# Patient Record
Sex: FEMALE | Race: WHITE | NOT HISPANIC OR LATINO
[De-identification: names, ages, dates, MRNs, and addresses within clinical notes are randomized per-mention and may not be internally consistent; named-entity substitution may affect disease eponyms.]

---

## 2018-03-08 ENCOUNTER — RESULT REVIEW (OUTPATIENT)
Age: 68
End: 2018-03-08

## 2018-11-16 ENCOUNTER — RECORD ABSTRACTING (OUTPATIENT)
Age: 68
End: 2018-11-16

## 2018-11-16 DIAGNOSIS — Z78.9 OTHER SPECIFIED HEALTH STATUS: ICD-10-CM

## 2018-11-16 DIAGNOSIS — Z82.0 FAMILY HISTORY OF EPILEPSY AND OTHER DISEASES OF THE NERVOUS SYSTEM: ICD-10-CM

## 2018-11-16 DIAGNOSIS — Z86.69 PERSONAL HISTORY OF OTHER DISEASES OF THE NERVOUS SYSTEM AND SENSE ORGANS: ICD-10-CM

## 2018-11-16 DIAGNOSIS — Z82.49 FAMILY HISTORY OF ISCHEMIC HEART DISEASE AND OTHER DISEASES OF THE CIRCULATORY SYSTEM: ICD-10-CM

## 2018-11-16 DIAGNOSIS — Z87.39 PERSONAL HISTORY OF OTHER DISEASES OF THE MUSCULOSKELETAL SYSTEM AND CONNECTIVE TISSUE: ICD-10-CM

## 2018-11-16 DIAGNOSIS — Z83.3 FAMILY HISTORY OF DIABETES MELLITUS: ICD-10-CM

## 2018-11-16 LAB — CYTOLOGY CVX/VAG DOC THIN PREP: NORMAL

## 2018-11-16 RX ORDER — ASCORBIC ACID 500 MG
500 TABLET ORAL DAILY
Refills: 0 | Status: ACTIVE | COMMUNITY

## 2018-12-07 ENCOUNTER — APPOINTMENT (OUTPATIENT)
Dept: CARDIOLOGY | Facility: CLINIC | Age: 68
End: 2018-12-07

## 2019-01-14 ENCOUNTER — RECORD ABSTRACTING (OUTPATIENT)
Age: 69
End: 2019-01-14

## 2019-01-15 ENCOUNTER — TRANSCRIPTION ENCOUNTER (OUTPATIENT)
Age: 69
End: 2019-01-15

## 2019-01-15 ENCOUNTER — NON-APPOINTMENT (OUTPATIENT)
Age: 69
End: 2019-01-15

## 2019-01-15 ENCOUNTER — APPOINTMENT (OUTPATIENT)
Dept: CARDIOLOGY | Facility: CLINIC | Age: 69
End: 2019-01-15
Payer: MEDICARE

## 2019-01-15 VITALS
WEIGHT: 161 LBS | DIASTOLIC BLOOD PRESSURE: 70 MMHG | HEIGHT: 61 IN | BODY MASS INDEX: 30.4 KG/M2 | SYSTOLIC BLOOD PRESSURE: 124 MMHG | HEART RATE: 63 BPM

## 2019-01-15 DIAGNOSIS — Z82.49 FAMILY HISTORY OF ISCHEMIC HEART DISEASE AND OTHER DISEASES OF THE CIRCULATORY SYSTEM: ICD-10-CM

## 2019-01-15 PROCEDURE — 99214 OFFICE O/P EST MOD 30 MIN: CPT

## 2019-01-15 PROCEDURE — 93000 ELECTROCARDIOGRAM COMPLETE: CPT

## 2019-01-15 NOTE — REASON FOR VISIT
[FreeTextEntry1] : Meeta Starkey is a 68-year-old female patient seen in cardiology follow up evaluation.\par \par Her problem list includes paroxysmal atrial fibrillation, dyslipidemia, valve abnormalities, obesity.\par \par She has additional medical problems as noted.\par \par Her PMD is Dr. Soto

## 2019-01-15 NOTE — PHYSICAL EXAM

## 2019-01-15 NOTE — HISTORY OF PRESENT ILLNESS
[FreeTextEntry1] : Since her last examination one year ago she reports no major events or hospitalizations. She is active but has not been engaging in formal exercise. I advised her regarding this.\par \par There have been no symptoms of significant palpitation, shortness of breath, chest discomfort, lightheadedness. She notes a rare, fleeting short-lived sensation of palpitation that is nonsustained.\par \par She has had laboratories with her primary care physician. She will be making efforts to obtain for me. She also reports she will be having upcoming laboratories that were she will provide.

## 2019-01-15 NOTE — DISCUSSION/SUMMARY
[FreeTextEntry1] : Paroxysmal atrial fibrillation\par Good control. Maintaining sinus rhythm.\par Her occasional palpitation is not suspicious of recurrent atrial fibrillation. Continue beta blocker.\par \par Dyslipidemia.\par This has improved over time. Continue pravastatin.\par I will review laboratories when kindly provided.\par \par Valvular heart disease\par Mild mitral insufficiency on her 2016 echo.\par I will order followup.

## 2019-02-19 ENCOUNTER — RESULT REVIEW (OUTPATIENT)
Age: 69
End: 2019-02-19

## 2019-02-22 ENCOUNTER — RESULT REVIEW (OUTPATIENT)
Age: 69
End: 2019-02-22

## 2019-03-05 ENCOUNTER — TRANSCRIPTION ENCOUNTER (OUTPATIENT)
Age: 69
End: 2019-03-05

## 2019-03-11 ENCOUNTER — APPOINTMENT (OUTPATIENT)
Dept: OBGYN | Facility: CLINIC | Age: 69
End: 2019-03-11

## 2019-06-10 ENCOUNTER — APPOINTMENT (OUTPATIENT)
Dept: OBGYN | Facility: CLINIC | Age: 69
End: 2019-06-10
Payer: MEDICARE

## 2019-06-10 VITALS
DIASTOLIC BLOOD PRESSURE: 62 MMHG | BODY MASS INDEX: 30.4 KG/M2 | SYSTOLIC BLOOD PRESSURE: 116 MMHG | WEIGHT: 161 LBS | HEIGHT: 61 IN

## 2019-06-10 DIAGNOSIS — Z78.0 ASYMPTOMATIC MENOPAUSAL STATE: ICD-10-CM

## 2019-06-10 PROCEDURE — 99397 PER PM REEVAL EST PAT 65+ YR: CPT

## 2019-06-11 PROBLEM — Z78.0 MENOPAUSE: Status: ACTIVE | Noted: 2019-06-10

## 2019-07-01 ENCOUNTER — RESULT REVIEW (OUTPATIENT)
Age: 69
End: 2019-07-01

## 2019-08-29 ENCOUNTER — APPOINTMENT (OUTPATIENT)
Dept: ENDOCRINOLOGY | Facility: CLINIC | Age: 69
End: 2019-08-29

## 2019-09-30 ENCOUNTER — APPOINTMENT (OUTPATIENT)
Dept: ENDOCRINOLOGY | Facility: CLINIC | Age: 69
End: 2019-09-30
Payer: MEDICARE

## 2019-09-30 VITALS
HEIGHT: 60.75 IN | OXYGEN SATURATION: 99 % | HEART RATE: 64 BPM | DIASTOLIC BLOOD PRESSURE: 80 MMHG | WEIGHT: 170 LBS | SYSTOLIC BLOOD PRESSURE: 122 MMHG | BODY MASS INDEX: 32.51 KG/M2

## 2019-09-30 PROCEDURE — 99205 OFFICE O/P NEW HI 60 MIN: CPT

## 2019-09-30 NOTE — HISTORY OF PRESENT ILLNESS
[FreeTextEntry1] : Ms. GARIMA WORRELL is 68 year old who presents for osteoporosis evaluation. \par she  was diagnosed of osteoporosis on the basis of     DXA scan , osteoporotic score at the level of left wrist \par her  Last DXA scan was done at  [place] on  July 2019 \par Osteoporotic scores at osteotropic c scores at [facility] \par she  has had fractures - yes , \par last fracture was in 2009, after she fractured her left wrist from standing height ,on er trip to [place] \par parental family h/o fracture -no \par h/o arthritis  in neck and lower back , sciatica \par She under went menopause at the age of 53 yrs \par she received any treatment for osteoporosis - no \par  h/o malignancies  -no \par  h/o radiation treatment -no \par  h/o clots -no \par h/o long-term steroids ( > 3 mths ) -no \par h/o phenytoin use , seizures  -no \par h/o eating disorders -no \par h/o prolonged amenorrhea -no \par Calcium intake - \par Dietary -1.5 servings daily \par supplemental none \par Vit D intake - 2000 IU daily \par h/o renal stones -no \par h/o reflux disease -intermittent , diet related \par h/o malabsorption -no \par h/o falls or balance issues -no \par daily exercise -not much \par \par

## 2019-10-07 ENCOUNTER — RESULT REVIEW (OUTPATIENT)
Age: 69
End: 2019-10-07

## 2019-10-14 ENCOUNTER — APPOINTMENT (OUTPATIENT)
Dept: ENDOCRINOLOGY | Facility: CLINIC | Age: 69
End: 2019-10-14
Payer: MEDICARE

## 2019-10-14 VITALS
SYSTOLIC BLOOD PRESSURE: 124 MMHG | HEIGHT: 60.75 IN | BODY MASS INDEX: 32.32 KG/M2 | DIASTOLIC BLOOD PRESSURE: 76 MMHG | OXYGEN SATURATION: 96 % | HEART RATE: 61 BPM | WEIGHT: 169 LBS

## 2019-10-14 PROCEDURE — 99215 OFFICE O/P EST HI 40 MIN: CPT

## 2019-10-14 NOTE — HISTORY OF PRESENT ILLNESS
[FreeTextEntry1] : Ms. GARIMA WORRELL is 68 year old who presents for osteoporosis evaluation. \par she  was diagnosed of osteoporosis on the basis of     DXA scan , osteoporotic score at the level of left wrist \par her  Last DXA scan was done at  [place] on  July 2019 \par she  has had fractures - yes , \par last fracture was in 2009, after she fractured her left wrist from standing height ,on er trip to [place] \par parental family h/o fracture -no \par h/o arthritis  in neck and lower back , sciatica \par She under went menopause at the age of 53 yrs \par she received any treatment for osteoporosis - no \par  h/o malignancies  -no \par  h/o radiation treatment -no \par  h/o clots -no \par h/o long-term steroids ( > 3 mths ) -no \par h/o phenytoin use , seizures  -no \par h/o eating disorders -no \par h/o prolonged amenorrhea -no \par Calcium intake - \par Dietary -1.5 servings daily \par supplemental none \par Vit D intake - 2000 IU daily \par h/o renal stones -no \par h/o reflux disease -intermittent , diet related \par h/o malabsorption -no \par h/o falls or balance issues -no \par daily exercise -not much \par \par 10/14/2019- FOLLOW UP\par labs results discussed in great details , blood work suggestive of normocalcemic hyperparathyroidism \par which explains the pattern of bone loss. \par rest labs are good, BTM are not high \par Xray s/o arthritic changes. \par Review of system \par no chest pain, no palpitations \par no Shortness of breath,no wheezing. \par . \par \par

## 2019-10-16 LAB
25(OH)D3 SERPL-MCNC: 35.9 NG/ML
ALBUMIN SERPL ELPH-MCNC: 4.4 G/DL
ALP BLD-CCNC: 63 U/L
ANION GAP SERPL CALC-SCNC: 14 MMOL/L
BUN SERPL-MCNC: 11 MG/DL
CALCIUM SERPL-MCNC: 9.4 MG/DL
CALCIUM SERPL-MCNC: 9.4 MG/DL
CHLORIDE SERPL-SCNC: 105 MMOL/L
CO2 SERPL-SCNC: 23 MMOL/L
COLLAGEN CTX SERPL-MCNC: 295 PG/ML
CREAT SERPL-MCNC: 0.71 MG/DL
GLUCOSE SERPL-MCNC: 101 MG/DL
M PROTEIN SPEC IFE-MCNC: NORMAL
PARATHYROID HORMONE INTACT: 73 PG/ML
PHOSPHATE SERPL-MCNC: 2.7 MG/DL
POTASSIUM SERPL-SCNC: 4.2 MMOL/L
SODIUM SERPL-SCNC: 142 MMOL/L

## 2020-02-27 ENCOUNTER — RESULT REVIEW (OUTPATIENT)
Age: 70
End: 2020-02-27

## 2020-03-02 DIAGNOSIS — N64.89 OTHER SPECIFIED DISORDERS OF BREAST: ICD-10-CM

## 2020-03-02 DIAGNOSIS — N63.0 UNSPECIFIED LUMP IN UNSPECIFIED BREAST: ICD-10-CM

## 2020-03-04 ENCOUNTER — RESULT REVIEW (OUTPATIENT)
Age: 70
End: 2020-03-04

## 2020-04-27 ENCOUNTER — APPOINTMENT (OUTPATIENT)
Dept: ENDOCRINOLOGY | Facility: CLINIC | Age: 70
End: 2020-04-27

## 2020-06-09 ENCOUNTER — APPOINTMENT (OUTPATIENT)
Dept: CARDIOLOGY | Facility: CLINIC | Age: 70
End: 2020-06-09
Payer: MEDICARE

## 2020-06-09 ENCOUNTER — NON-APPOINTMENT (OUTPATIENT)
Age: 70
End: 2020-06-09

## 2020-06-09 VITALS
SYSTOLIC BLOOD PRESSURE: 130 MMHG | HEIGHT: 60.75 IN | DIASTOLIC BLOOD PRESSURE: 80 MMHG | HEART RATE: 60 BPM | WEIGHT: 169 LBS | BODY MASS INDEX: 32.32 KG/M2

## 2020-06-09 DIAGNOSIS — E66.01 MORBID (SEVERE) OBESITY DUE TO EXCESS CALORIES: ICD-10-CM

## 2020-06-09 PROCEDURE — 93000 ELECTROCARDIOGRAM COMPLETE: CPT

## 2020-06-09 PROCEDURE — 99214 OFFICE O/P EST MOD 30 MIN: CPT

## 2020-06-09 NOTE — DISCUSSION/SUMMARY
[FreeTextEntry1] : Brief recommendations and follow-up: (see above for details)\par \par Patient's overall cardiovascular status is stable.\par She does need attention toward therapeutic lifestyle treatment.  I do believe she is motivated.\par She is active and I encouraged her regarding more formal exercise and to watch her diet.

## 2020-06-09 NOTE — HISTORY OF PRESENT ILLNESS
[FreeTextEntry1] : This 69 year-old female patient presents for cardiovascular evaluation.\par \par Her problem list is as noted above.\par \par Since her last examination a year and a half ago there have been no major events or hospitalizations.  She has been more sedentary than she would like to be.  She has had some orthopedic and sciatic issues.\par \par There have been no acute symptoms of shortness of breath, chest discomfort, palpitation, lightheadedness.\par \par She did have a diagnosis of a "breast cyst" that did not require intervention.  It is being followed.

## 2020-06-09 NOTE — REASON FOR VISIT
[FreeTextEntry1] : Ms. GARIMA WORRELL has the following problem list:\par \par Paroxysmal atrial fibrillation\par Dyslipidemia\par Valve abnormalities\par Obesity\par \par She has additional medical problems as noted.\par \par Her primary care physician is Dr. Soto

## 2020-06-09 NOTE — ASSESSMENT
[FreeTextEntry1] : EKG 86/9/20.  Sinus rhythm.  Minor right-sided conduction delay.  Normal variant.  No acute changes.\par EKG 1/15/19. Sinus rhythm. Minor right-sided conduction delay. No acute changes.

## 2020-07-27 ENCOUNTER — APPOINTMENT (OUTPATIENT)
Dept: OBGYN | Facility: CLINIC | Age: 70
End: 2020-07-27
Payer: MEDICARE

## 2020-07-27 VITALS
BODY MASS INDEX: 32.13 KG/M2 | TEMPERATURE: 98.3 F | DIASTOLIC BLOOD PRESSURE: 74 MMHG | SYSTOLIC BLOOD PRESSURE: 122 MMHG | HEIGHT: 60.75 IN | WEIGHT: 168 LBS

## 2020-07-27 PROCEDURE — 99397 PER PM REEVAL EST PAT 65+ YR: CPT

## 2020-07-27 PROCEDURE — G0101: CPT

## 2020-07-29 LAB — HPV HIGH+LOW RISK DNA PNL CVX: NOT DETECTED

## 2020-08-06 LAB — CYTOLOGY CVX/VAG DOC THIN PREP: ABNORMAL

## 2020-09-10 ENCOUNTER — RESULT REVIEW (OUTPATIENT)
Age: 70
End: 2020-09-10

## 2021-03-08 ENCOUNTER — RESULT REVIEW (OUTPATIENT)
Age: 71
End: 2021-03-08

## 2021-10-12 ENCOUNTER — NON-APPOINTMENT (OUTPATIENT)
Age: 71
End: 2021-10-12

## 2021-10-13 ENCOUNTER — APPOINTMENT (OUTPATIENT)
Dept: CARDIOLOGY | Facility: CLINIC | Age: 71
End: 2021-10-13
Payer: MEDICARE

## 2021-10-13 ENCOUNTER — NON-APPOINTMENT (OUTPATIENT)
Age: 71
End: 2021-10-13

## 2021-10-13 VITALS
BODY MASS INDEX: 31.56 KG/M2 | SYSTOLIC BLOOD PRESSURE: 125 MMHG | DIASTOLIC BLOOD PRESSURE: 80 MMHG | HEIGHT: 60.75 IN | HEART RATE: 53 BPM | WEIGHT: 165 LBS

## 2021-10-13 PROCEDURE — 99214 OFFICE O/P EST MOD 30 MIN: CPT

## 2021-10-13 PROCEDURE — 93000 ELECTROCARDIOGRAM COMPLETE: CPT

## 2021-10-13 RX ORDER — ASPIRIN 81 MG/1
81 TABLET ORAL DAILY
Refills: 0 | Status: DISCONTINUED | COMMUNITY
End: 2021-10-13

## 2021-10-13 NOTE — REVIEW OF SYSTEMS
[Negative] : Heme/Lymph [FreeTextEntry3] : She reports early cataracts. [FreeTextEntry5] : HPI [FreeTextEntry4] : Improved tenderness. [FreeTextEntry7] : Static GERD. [FreeTextEntry8] : Nocturia 0, 1, or 2. [FreeTextEntry9] : Current right trochanteric bursitis.  Improved sciatica.

## 2021-10-13 NOTE — ASSESSMENT
[FreeTextEntry1] : EKG 10/13/2021.  Sinus bradycardia.  Heart rate 53.  Otherwise within normal limits.\par EKG 86/9/20.  Sinus rhythm.  Minor right-sided conduction delay.  Normal variant.  No acute changes.\par EKG 1/15/19. Sinus rhythm. Minor right-sided conduction delay. No acute changes.

## 2021-10-13 NOTE — CARDIOLOGY SUMMARY
[de-identified] : Holter. sinus rhythm. Rare VPC. Rare APC. No evidence of recurrent atrial fibrillation.\par     07/2013 \par  [de-identified] : Echo 2/16/19. Normal LV function. EF 70%. Mild diastolic dysfunction. Normal valve\par     morphology. Trace AI. Trace TR. Normal PA, 22-27.\par

## 2021-10-13 NOTE — DISCUSSION/SUMMARY
[FreeTextEntry1] : Brief recommendations and follow-up: (see above for details)\par \par Patient's overall cardiovascular status is stable.\par As noted she does need some attention to risk factor reduction.\par She is enthusiastic about therapeutic lifestyle improvement.\par She will forward upcoming laboratories.\par As noted, if necessary increase the dose of pravastatin.\par The patient also reports that her brother had an abdominal aortic aneurysm.  I will order a screening ultrasound.\par Next routine cardiology visit 1 year.

## 2021-10-13 NOTE — HISTORY OF PRESENT ILLNESS
[FreeTextEntry1] : This 70 year-old female patient presents for cardiovascular evaluation.\par \par Her problem list is as noted above.\par \par 1 year ago she reports some medical interactions.  There have been no major events or hospitalizations however she has been struggling with some right hip symptoms.  The working diagnosis is trochanteric bursitis.  She has had injection therapy, physical therapy, prednisone.  She still remains with symptoms.  Her activities have been more limited that she would like.\par \par There have been no acute symptoms of shortness of breath, chest discomfort.  She notes rare palpitation but no sustained arrhythmia.  No lightheadedness or fainting.\par \par She has seen her primary care physician and had laboratories that she kindly provided.\par

## 2021-11-09 ENCOUNTER — RESULT REVIEW (OUTPATIENT)
Age: 71
End: 2021-11-09

## 2022-04-13 ENCOUNTER — RESULT REVIEW (OUTPATIENT)
Age: 72
End: 2022-04-13

## 2022-05-16 ENCOUNTER — NON-APPOINTMENT (OUTPATIENT)
Age: 72
End: 2022-05-16

## 2022-05-18 ENCOUNTER — NON-APPOINTMENT (OUTPATIENT)
Age: 72
End: 2022-05-18

## 2022-05-19 ENCOUNTER — APPOINTMENT (OUTPATIENT)
Dept: OBGYN | Facility: CLINIC | Age: 72
End: 2022-05-19
Payer: MEDICARE

## 2022-05-19 VITALS
DIASTOLIC BLOOD PRESSURE: 70 MMHG | WEIGHT: 165 LBS | SYSTOLIC BLOOD PRESSURE: 122 MMHG | HEIGHT: 60.75 IN | BODY MASS INDEX: 31.56 KG/M2

## 2022-05-19 DIAGNOSIS — Z01.419 ENCOUNTER FOR GYNECOLOGICAL EXAMINATION (GENERAL) (ROUTINE) W/OUT ABNORMAL FINDINGS: ICD-10-CM

## 2022-05-19 PROCEDURE — G0101: CPT

## 2022-05-19 RX ORDER — NAPROXEN 500 MG/1
500 TABLET ORAL
Qty: 60 | Refills: 0 | Status: COMPLETED | COMMUNITY
Start: 2018-12-17 | End: 2022-05-19

## 2022-05-19 NOTE — HISTORY OF PRESENT ILLNESS
[FreeTextEntry1] : 72yo  Postmenopausal without HRT here for Gyn exam. Saw Dr. Magana for osteoporosis-> normocalcemic hyperparathyroidism. Expecting 7th grandchild in July! Having right hip pain(torn labrum)- getting acupuncture and recently had injection of platelet rich plasma(her own blood)\par \par \par OB History\par Total pregnancies  4.  \par Total living children  4.  \par C section(s)  4.  [Mammogramdate] : 4/13/22 [TextBox_19] : BIRADS 1 [PapSmeardate] : 7/27/20 [TextBox_31] : Neg/HPV Neg [BoneDensityDate] : 7/1/19 [TextBox_37] : T Score Spine -2.3 Hip -1.1 Fem Neck -1.8. [ColonoscopyDate] : scheduled

## 2022-05-22 LAB — HPV HIGH+LOW RISK DNA PNL CVX: NOT DETECTED

## 2022-05-28 LAB — CYTOLOGY CVX/VAG DOC THIN PREP: ABNORMAL

## 2022-06-01 ENCOUNTER — RX RENEWAL (OUTPATIENT)
Age: 72
End: 2022-06-01

## 2022-06-28 ENCOUNTER — APPOINTMENT (OUTPATIENT)
Dept: GERIATRICS | Facility: CLINIC | Age: 72
End: 2022-06-28
Payer: MEDICARE

## 2022-06-28 VITALS
TEMPERATURE: 98.1 F | WEIGHT: 169 LBS | SYSTOLIC BLOOD PRESSURE: 130 MMHG | HEART RATE: 80 BPM | OXYGEN SATURATION: 98 % | BODY MASS INDEX: 32.2 KG/M2 | DIASTOLIC BLOOD PRESSURE: 80 MMHG

## 2022-06-28 DIAGNOSIS — F41.9 ANXIETY DISORDER, UNSPECIFIED: ICD-10-CM

## 2022-06-28 PROCEDURE — 99204 OFFICE O/P NEW MOD 45 MIN: CPT

## 2022-06-28 NOTE — HISTORY OF PRESENT ILLNESS
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\par labs 21\par mammo - \par pap \par dexa - \par colonoscopy - never\par did cologuard she thinks\par \par broke wrist - \par fell\par \par anxiety \par takes oocas lorazapem\par \par once a fib - no AC\par \par right hip has been to three orthos\par had xrays, mri\par had two cortisone injections - no help\par had PRP in hip\par tear in gluteus minimimus - lifting grandchildren\par \par has 4 children\par 7 grandchildren\par \par had her first child at 18\par everyone local\par \par mother  at 90 - got dementia at 85\par father  at 74 MI\par 3 brothers - younger - \par MI at 46 - ok now\par prostate ca - age 49 - ok now\par third brother - had tear in aorta -  repair\par \par walks 1-1 /2 miles a day\par does yoga

## 2022-06-28 NOTE — REVIEW OF SYSTEMS
[Recent Weight Gain (___ Lbs)] : recent [unfilled] ~Ulb weight gain [Negative] : Heme/Lymph [FreeTextEntry9] : Low back pain [de-identified] : hip improving

## 2022-06-28 NOTE — ASSESSMENT
[FreeTextEntry1] : pt has achy thighs - asks re changing statin\par can consider change when wants\par \par no recent afib\par \par needs DEXA\par \par needs colonoscopy\par \par check labs today\par

## 2022-06-28 NOTE — PHYSICAL EXAM
[Alert] : alert [Well Nourished] : well nourished [Healthy Appearing] : healthy appearing [No Acute Distress] : in no acute distress [Well Developed] : well developed [Normal Voice/Communication] : normal voice/communication [Normal Appearance] : the appearance of the neck was normal [No Neck Mass] : no neck mass was observed [Thyroid Not Enlarged] : the thyroid was not enlarged [No Thyroid Nodules] : there were no palpable thyroid nodules [No Respiratory Distress] : no respiratory distress [No Acc Muscle Use] : no accessory muscle use [Respiration, Rhythm And Depth] : normal respiratory rhythm and effort [Auscultation Breath Sounds / Voice Sounds] : lungs were clear to auscultation bilaterally [Normal S1, S2] : normal S1 and S2 [Heart Rate And Rhythm] : heart rate was normal and rhythm regular [Edema] : edema was not present [No Masses] : no abdominal mass palpated [Abdomen Soft] : soft [Normal Gait] : normal gait [No Clubbing, Cyanosis] : no clubbing or cyanosis of the fingernails [Involuntary Movements] : no involuntary movements were seen [Motor Tone] : muscle strength and tone were normal [] : no rash [Normal Turgor] : normal skin turgor [Skin Lesions] : no skin lesions [Normal] : no focal deficits [No Focal Deficits] : no focal deficits [Motor Exam] : the motor exam was normal [Oriented To Time, Place, And Person] : oriented to person, place, and time [Normal Affect] : the affect was normal [Normal Mood] : the mood was normal [de-identified] : c section scar (4 c sections) [de-identified] : redness lower back ( from heating pad) [de-identified] : red back

## 2022-06-29 LAB
25(OH)D3 SERPL-MCNC: 66.7 NG/ML
ALBUMIN SERPL ELPH-MCNC: 4.8 G/DL
ALP BLD-CCNC: 67 U/L
ALT SERPL-CCNC: 7 U/L
ANION GAP SERPL CALC-SCNC: 13 MMOL/L
AST SERPL-CCNC: 21 U/L
BASOPHILS # BLD AUTO: 0.02 K/UL
BASOPHILS NFR BLD AUTO: 0.3 %
BILIRUB SERPL-MCNC: 0.5 MG/DL
BUN SERPL-MCNC: 11 MG/DL
CALCIUM SERPL-MCNC: 10 MG/DL
CALCIUM SERPL-MCNC: 10 MG/DL
CHLORIDE SERPL-SCNC: 103 MMOL/L
CHOLEST SERPL-MCNC: 179 MG/DL
CO2 SERPL-SCNC: 23 MMOL/L
CREAT SERPL-MCNC: 0.79 MG/DL
EGFR: 80 ML/MIN/1.73M2
EOSINOPHIL # BLD AUTO: 0.1 K/UL
EOSINOPHIL NFR BLD AUTO: 1.7 %
GLUCOSE SERPL-MCNC: 105 MG/DL
HCT VFR BLD CALC: 40.6 %
HDLC SERPL-MCNC: 61 MG/DL
HGB BLD-MCNC: 13.3 G/DL
IMM GRANULOCYTES NFR BLD AUTO: 0.2 %
LDLC SERPL CALC-MCNC: 86 MG/DL
LYMPHOCYTES # BLD AUTO: 1.51 K/UL
LYMPHOCYTES NFR BLD AUTO: 24.9 %
MAN DIFF?: NORMAL
MCHC RBC-ENTMCNC: 30.9 PG
MCHC RBC-ENTMCNC: 32.8 GM/DL
MCV RBC AUTO: 94.2 FL
MONOCYTES # BLD AUTO: 0.45 K/UL
MONOCYTES NFR BLD AUTO: 7.4 %
NEUTROPHILS # BLD AUTO: 3.97 K/UL
NEUTROPHILS NFR BLD AUTO: 65.5 %
NONHDLC SERPL-MCNC: 119 MG/DL
PARATHYROID HORMONE INTACT: 66 PG/ML
PLATELET # BLD AUTO: 280 K/UL
POTASSIUM SERPL-SCNC: 4.2 MMOL/L
PROT SERPL-MCNC: 6.9 G/DL
RBC # BLD: 4.31 M/UL
RBC # FLD: 12.9 %
SODIUM SERPL-SCNC: 139 MMOL/L
TRIGL SERPL-MCNC: 163 MG/DL
TSH SERPL-ACNC: 1.3 UIU/ML
VIT B12 SERPL-MCNC: 331 PG/ML
WBC # FLD AUTO: 6.06 K/UL

## 2022-10-14 ENCOUNTER — NON-APPOINTMENT (OUTPATIENT)
Age: 72
End: 2022-10-14

## 2022-10-18 ENCOUNTER — NON-APPOINTMENT (OUTPATIENT)
Age: 72
End: 2022-10-18

## 2022-10-19 ENCOUNTER — APPOINTMENT (OUTPATIENT)
Dept: CARDIOLOGY | Facility: CLINIC | Age: 72
End: 2022-10-19

## 2022-10-19 ENCOUNTER — NON-APPOINTMENT (OUTPATIENT)
Age: 72
End: 2022-10-19

## 2022-10-19 VITALS
WEIGHT: 165 LBS | BODY MASS INDEX: 31.44 KG/M2 | DIASTOLIC BLOOD PRESSURE: 74 MMHG | OXYGEN SATURATION: 98 % | SYSTOLIC BLOOD PRESSURE: 130 MMHG

## 2022-10-19 DIAGNOSIS — Z01.89 ENCOUNTER FOR OTHER SPECIFIED SPECIAL EXAMINATIONS: ICD-10-CM

## 2022-10-19 DIAGNOSIS — Z13.6 ENCOUNTER FOR SCREENING FOR CARDIOVASCULAR DISORDERS: ICD-10-CM

## 2022-10-19 DIAGNOSIS — I38 ENDOCARDITIS, VALVE UNSPECIFIED: ICD-10-CM

## 2022-10-19 DIAGNOSIS — E66.9 OBESITY, UNSPECIFIED: ICD-10-CM

## 2022-10-19 PROCEDURE — 99214 OFFICE O/P EST MOD 30 MIN: CPT | Mod: 25

## 2022-10-19 PROCEDURE — 93000 ELECTROCARDIOGRAM COMPLETE: CPT

## 2022-10-19 NOTE — DISCUSSION/SUMMARY
[FreeTextEntry1] : Brief recommendations and follow-up: (see above for details)\par \par Patient's overall cardiovascular status is stable.\par Her arrhythmias appeared under satisfactory control with details as noted above.\par Lipids under good control.\par I advised her regarding therapeutic lifestyle treatment.\par Next routine cardiology visit 1 year.

## 2022-10-19 NOTE — HISTORY OF PRESENT ILLNESS
[FreeTextEntry1] : This 71 year-old female patient presents for cardiovascular evaluation.\par \par Her problem list is as noted above.\par \par Since her last examination 1 year ago she reports some medical interactions.  She establish care with a new primary care physician.  Laboratories were drawn that are reviewed below.\par \par The patient has remained active and is improving her functional capacity after platelet rich plasma infusion of her right hip.  She did have chronic symptoms.  I was pleased to hear she is doing better.\par \par She notes occasional palpitation, perhaps 2 or 3 times in the past year.  The longest it lasted was 12 hours.  I did advise her to seek medical attention for any concerns or sustained arrhythmias.  She took another metoprolol with good effect.\par \par

## 2022-10-19 NOTE — REASON FOR VISIT
[FreeTextEntry1] : Ms. GARIMA WORRELL has the following problem list:\par \par Paroxysmal atrial fibrillation\par Dyslipidemia\par Valve abnormalities\par Obesity\par \par She has additional medical problems as noted.\par \par Her primary care physician is Dr. Jurado

## 2022-10-19 NOTE — CARDIOLOGY SUMMARY
[de-identified] : Holter. sinus rhythm. Rare VPC. Rare APC. No evidence of recurrent atrial fibrillation.  07/2013 \par  [de-identified] : Echo 2/16/19. Normal LV function. EF 70%. Mild diastolic dysfunction. Normal valve  morphology. Trace AI. Trace TR. Normal PA, 22-27.\par  [de-identified] : Abdominal aortic ultrasound 11/9/2021.  No aneurysm.

## 2022-10-19 NOTE — REVIEW OF SYSTEMS
[Negative] : ENT [FreeTextEntry3] : She reports early cataracts. [FreeTextEntry5] : HPI [FreeTextEntry7] : GERD. [FreeTextEntry8] : Nocturia 0, 1, or 2. [FreeTextEntry9] : Chronic right hip symptoms, improved with platelet rich plasma.  Improved sciatica.

## 2022-10-19 NOTE — ASSESSMENT
[FreeTextEntry1] : EKG 10/19/2022.  Sinus rhythm, minor right-sided conduction delay.  Normal variant.  No significant change.\par EKG 10/13/2021.  Sinus bradycardia.  Heart rate 53.  Otherwise within normal limits.\par EKG 86/9/20.  Sinus rhythm.  Minor right-sided conduction delay.  Normal variant.  No acute changes.\par EKG 1/15/19. Sinus rhythm. Minor right-sided conduction delay. No acute changes.

## 2022-12-13 ENCOUNTER — APPOINTMENT (OUTPATIENT)
Dept: GERIATRICS | Facility: CLINIC | Age: 72
End: 2022-12-13

## 2022-12-13 DIAGNOSIS — U07.1 COVID-19: ICD-10-CM

## 2022-12-13 PROCEDURE — 99212 OFFICE O/P EST SF 10 MIN: CPT | Mod: 95

## 2022-12-13 NOTE — HISTORY OF PRESENT ILLNESS
[FreeTextEntry1] : friday got covid friday\par today is day 5\par cold symptoms, t max 101\par tested at home\par  got it first\par was not sick - did not need paxlovid\par \par had four shots\par had appt for booster the day she got  covid\par \par pt is getting better\par will isolate 5 days and mask at least 5 more\par mild symptoms\par \par renewed lorazepam as requested\par

## 2023-07-11 ENCOUNTER — APPOINTMENT (OUTPATIENT)
Dept: GERIATRICS | Facility: CLINIC | Age: 73
End: 2023-07-11
Payer: MEDICARE

## 2023-07-11 VITALS
BODY MASS INDEX: 31.21 KG/M2 | DIASTOLIC BLOOD PRESSURE: 70 MMHG | SYSTOLIC BLOOD PRESSURE: 142 MMHG | HEART RATE: 71 BPM | WEIGHT: 163.8 LBS | TEMPERATURE: 98.1 F | OXYGEN SATURATION: 97 %

## 2023-07-11 VITALS — DIASTOLIC BLOOD PRESSURE: 78 MMHG | SYSTOLIC BLOOD PRESSURE: 125 MMHG

## 2023-07-11 DIAGNOSIS — E78.5 HYPERLIPIDEMIA, UNSPECIFIED: ICD-10-CM

## 2023-07-11 DIAGNOSIS — M81.8 OTHER OSTEOPOROSIS W/OUT CURRENT PATHOLOGICAL FRACTURE: ICD-10-CM

## 2023-07-11 DIAGNOSIS — E34.9 ENDOCRINE DISORDER, UNSPECIFIED: ICD-10-CM

## 2023-07-11 DIAGNOSIS — Z00.00 ENCOUNTER FOR GENERAL ADULT MEDICAL EXAMINATION W/OUT ABNORMAL FINDINGS: ICD-10-CM

## 2023-07-11 PROCEDURE — G0439: CPT

## 2023-07-11 RX ORDER — CHOLECALCIFEROL (VITAMIN D3) 25 MCG
25 MCG CAPSULE ORAL
Refills: 0 | Status: ACTIVE | COMMUNITY

## 2023-07-11 NOTE — HISTORY OF PRESENT ILLNESS
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\par labs 21\par mammo - \par pap \par dexa - \par colonoscopy - never\par did cologuard she thinks\par \par broke wrist - \par fell\par \par anxiety \par takes oocas lorazapem\par \par once a fib - no AC\par \par right hip has been to three orthos\par had xrays, mri\par had two cortisone injections - no help\par had PRP in hip\par tear in gluteus minimimus - lifting grandchildren\par \par has 4 children\par 7 grandchildren\par \par 23\par pt has h/o premature heart disease\par follows with [facility]\par needs mammo and dexa\par had her first child at 18\par everyone local\par \par mother  at 90 - got dementia at 85\par father  at 74 MI\par 3 brothers - younger - \par MI at 46 - ok now\par prostate ca - age 49 - ok now\par third brother - had tear in aorta -  repair\par \par walks 1-1 /2 miles a day\par does yoga\par \par mother 90 and grandma 80 - both had dementia

## 2023-07-11 NOTE — PHYSICAL EXAM
[Alert] : alert [Well Nourished] : well nourished [Healthy Appearing] : healthy appearing [Well Developed] : well developed [Normal Voice/Communication] : normal voice/communication [No Neck Mass] : no neck mass was observed [Thyroid Not Enlarged] : the thyroid was not enlarged [No Thyroid Nodules] : there were no palpable thyroid nodules [No Respiratory Distress] : no respiratory distress [No Acc Muscle Use] : no accessory muscle use [Respiration, Rhythm And Depth] : normal respiratory rhythm and effort [Auscultation Breath Sounds / Voice Sounds] : lungs were clear to auscultation bilaterally [Normal S1, S2] : normal S1 and S2 [Heart Rate And Rhythm] : heart rate was normal and rhythm regular [Edema] : edema was not present [No Masses] : no abdominal mass palpated [Abdomen Soft] : soft [Normal Gait] : normal gait [No Clubbing, Cyanosis] : no clubbing or cyanosis of the fingernails [Involuntary Movements] : no involuntary movements were seen [Motor Tone] : muscle strength and tone were normal [] : no rash [Normal Turgor] : normal skin turgor [Skin Lesions] : no skin lesions [Normal] : no focal deficits [No Focal Deficits] : no focal deficits [Motor Exam] : the motor exam was normal [Oriented To Time, Place, And Person] : oriented to person, place, and time [Normal Affect] : the affect was normal [Normal Mood] : the mood was normal [Normal Appearance] : normal in appearance [Breast Palpation Mass] : no palpable masses [No Nipple Discharge] : no nipple discharge [de-identified] : c section scar (4 c sections) [de-identified] : red back

## 2023-07-11 NOTE — REVIEW OF SYSTEMS
[Shortness Of Breath] : shortness of breath [Negative] : Integumentary [Recent Weight Gain (___ Lbs)] : no recent weight gain [FreeTextEntry5] : sees Dr Perales yearly [FreeTextEntry6] : sob relief with lorazepam

## 2023-07-11 NOTE — ASSESSMENT
[FreeTextEntry1] : pt has achy thighs - asks re changing statin\par can consider change when wants\par \par no recent afib\par \par needs DEXA\par \par needs colonoscopy\par \par check labs today\par \par 7/11/23\par pt is doing well\par asked re calcium score - to address - with dr conte\par checking annual labs\par ordered mammo and dexa\par doing gym every two years\par

## 2023-07-12 LAB
25(OH)D3 SERPL-MCNC: 64.8 NG/ML
ALBUMIN SERPL ELPH-MCNC: 4.8 G/DL
ALP BLD-CCNC: 80 U/L
ALT SERPL-CCNC: 13 U/L
ANION GAP SERPL CALC-SCNC: 12 MMOL/L
AST SERPL-CCNC: 23 U/L
BILIRUB SERPL-MCNC: 0.4 MG/DL
BUN SERPL-MCNC: 14 MG/DL
CALCIUM SERPL-MCNC: 10.6 MG/DL
CALCIUM SERPL-MCNC: 10.6 MG/DL
CHLORIDE SERPL-SCNC: 103 MMOL/L
CHOLEST SERPL-MCNC: 176 MG/DL
CO2 SERPL-SCNC: 25 MMOL/L
CREAT SERPL-MCNC: 0.81 MG/DL
EGFR: 77 ML/MIN/1.73M2
ESTIMATED AVERAGE GLUCOSE: 120 MG/DL
GLUCOSE SERPL-MCNC: 109 MG/DL
HBA1C MFR BLD HPLC: 5.8 %
HDLC SERPL-MCNC: 67 MG/DL
LDLC SERPL CALC-MCNC: 86 MG/DL
MAGNESIUM SERPL-MCNC: 2 MG/DL
NONHDLC SERPL-MCNC: 109 MG/DL
PARATHYROID HORMONE INTACT: 52 PG/ML
POTASSIUM SERPL-SCNC: 4.1 MMOL/L
PROT SERPL-MCNC: 7.3 G/DL
SODIUM SERPL-SCNC: 140 MMOL/L
TRIGL SERPL-MCNC: 130 MG/DL
TSH SERPL-ACNC: 1.23 UIU/ML
VIT B12 SERPL-MCNC: 400 PG/ML

## 2023-08-30 ENCOUNTER — RESULT REVIEW (OUTPATIENT)
Age: 73
End: 2023-08-30

## 2023-10-10 ENCOUNTER — NON-APPOINTMENT (OUTPATIENT)
Age: 73
End: 2023-10-10

## 2023-10-24 ENCOUNTER — APPOINTMENT (OUTPATIENT)
Dept: CARDIOLOGY | Facility: CLINIC | Age: 73
End: 2023-10-24

## 2023-10-30 DIAGNOSIS — Z92.89 PERSONAL HISTORY OF OTHER MEDICAL TREATMENT: ICD-10-CM

## 2023-11-14 ENCOUNTER — APPOINTMENT (OUTPATIENT)
Dept: GERIATRICS | Facility: CLINIC | Age: 73
End: 2023-11-14
Payer: MEDICARE

## 2023-11-14 VITALS
DIASTOLIC BLOOD PRESSURE: 70 MMHG | WEIGHT: 156 LBS | BODY MASS INDEX: 29.72 KG/M2 | TEMPERATURE: 97.7 F | OXYGEN SATURATION: 98 % | SYSTOLIC BLOOD PRESSURE: 122 MMHG | HEART RATE: 99 BPM

## 2023-11-14 DIAGNOSIS — Z23 ENCOUNTER FOR IMMUNIZATION: ICD-10-CM

## 2023-11-14 DIAGNOSIS — I25.2 OLD MYOCARDIAL INFARCTION: ICD-10-CM

## 2023-11-14 DIAGNOSIS — Z95.5 PRESENCE OF CORONARY ANGIOPLASTY IMPLANT AND GRAFT: ICD-10-CM

## 2023-11-14 DIAGNOSIS — I48.0 PAROXYSMAL ATRIAL FIBRILLATION: ICD-10-CM

## 2023-11-14 DIAGNOSIS — I25.10 ATHEROSCLEROTIC HEART DISEASE OF NATIVE CORONARY ARTERY W/OUT ANGINA PECTORIS: ICD-10-CM

## 2023-11-14 PROCEDURE — 90678 RSV VACC PREF BIVALENT IM: CPT | Mod: GY

## 2023-11-14 PROCEDURE — 99214 OFFICE O/P EST MOD 30 MIN: CPT

## 2023-11-14 PROCEDURE — 90471 IMMUNIZATION ADMIN: CPT

## 2023-11-14 RX ORDER — TICAGRELOR 90 MG/1
90 TABLET ORAL TWICE DAILY
Refills: 0 | Status: ACTIVE | COMMUNITY
Start: 2023-11-14

## 2023-11-14 RX ORDER — ESCITALOPRAM OXALATE 10 MG/1
10 TABLET ORAL
Qty: 90 | Refills: 3 | Status: ACTIVE | COMMUNITY
Start: 2023-11-14 | End: 1900-01-01

## 2023-11-14 RX ORDER — SPIRONOLACTONE 25 MG/1
25 TABLET ORAL DAILY
Refills: 0 | Status: ACTIVE | COMMUNITY
Start: 2023-11-14

## 2023-11-14 RX ORDER — DAPAGLIFLOZIN 10 MG/1
10 TABLET, FILM COATED ORAL DAILY
Refills: 0 | Status: ACTIVE | COMMUNITY
Start: 2023-11-14

## 2023-11-14 RX ORDER — METOPROLOL SUCCINATE 25 MG/1
25 TABLET, EXTENDED RELEASE ORAL
Qty: 45 | Refills: 3 | Status: ACTIVE | COMMUNITY
Start: 2022-06-01

## 2023-11-14 RX ORDER — ATORVASTATIN CALCIUM 80 MG/1
80 TABLET, FILM COATED ORAL
Qty: 90 | Refills: 3 | Status: ACTIVE | COMMUNITY
Start: 2023-11-14

## 2023-11-14 RX ORDER — SACUBITRIL AND VALSARTAN 24; 26 MG/1; MG/1
24-26 TABLET, FILM COATED ORAL
Qty: 180 | Refills: 3 | Status: ACTIVE | COMMUNITY
Start: 2023-11-14

## 2023-11-28 ENCOUNTER — RX RENEWAL (OUTPATIENT)
Age: 73
End: 2023-11-28

## 2023-11-28 RX ORDER — PRAVASTATIN SODIUM 20 MG/1
20 TABLET ORAL DAILY
Qty: 90 | Refills: 3 | Status: ACTIVE | COMMUNITY
Start: 2023-11-28 | End: 1900-01-01

## 2023-12-12 ENCOUNTER — LABORATORY RESULT (OUTPATIENT)
Age: 73
End: 2023-12-12

## 2024-06-04 RX ORDER — LORAZEPAM 0.5 MG/1
0.5 TABLET ORAL
Qty: 90 | Refills: 0 | Status: ACTIVE | COMMUNITY
Start: 1900-01-01 | End: 1900-01-01

## 2024-08-20 ENCOUNTER — APPOINTMENT (OUTPATIENT)
Dept: GERIATRICS | Facility: CLINIC | Age: 74
End: 2024-08-20
Payer: MEDICARE

## 2024-08-20 VITALS
DIASTOLIC BLOOD PRESSURE: 75 MMHG | TEMPERATURE: 97 F | HEART RATE: 58 BPM | SYSTOLIC BLOOD PRESSURE: 128 MMHG | BODY MASS INDEX: 28.54 KG/M2 | OXYGEN SATURATION: 98 % | WEIGHT: 149.8 LBS

## 2024-08-20 DIAGNOSIS — Z87.11 PERSONAL HISTORY OF PEPTIC ULCER DISEASE: ICD-10-CM

## 2024-08-20 DIAGNOSIS — I48.0 PAROXYSMAL ATRIAL FIBRILLATION: ICD-10-CM

## 2024-08-20 DIAGNOSIS — I38 ENDOCARDITIS, VALVE UNSPECIFIED: ICD-10-CM

## 2024-08-20 DIAGNOSIS — I25.2 OLD MYOCARDIAL INFARCTION: ICD-10-CM

## 2024-08-20 DIAGNOSIS — E78.5 HYPERLIPIDEMIA, UNSPECIFIED: ICD-10-CM

## 2024-08-20 DIAGNOSIS — I25.10 ATHEROSCLEROTIC HEART DISEASE OF NATIVE CORONARY ARTERY W/OUT ANGINA PECTORIS: ICD-10-CM

## 2024-08-20 DIAGNOSIS — Z95.5 PRESENCE OF CORONARY ANGIOPLASTY IMPLANT AND GRAFT: ICD-10-CM

## 2024-08-20 PROCEDURE — G0444 DEPRESSION SCREEN ANNUAL: CPT | Mod: 59

## 2024-08-20 PROCEDURE — 99214 OFFICE O/P EST MOD 30 MIN: CPT

## 2024-08-20 RX ORDER — COLCHICINE 0.6 MG/1
0.6 TABLET ORAL DAILY
Refills: 0 | Status: ACTIVE | COMMUNITY
Start: 2024-08-20

## 2024-08-20 RX ORDER — PANTOPRAZOLE 40 MG/1
40 TABLET, DELAYED RELEASE ORAL
Refills: 0 | Status: ACTIVE | COMMUNITY
Start: 2024-08-20

## 2024-08-20 RX ORDER — APIXABAN 5 MG/1
5 TABLET, FILM COATED ORAL
Qty: 180 | Refills: 3 | Status: ACTIVE | COMMUNITY
Start: 2024-08-20

## 2024-08-20 NOTE — REVIEW OF SYSTEMS
[Abdominal Pain] : no abdominal pain [Heartburn] : no heartburn [Melena] : no melena [Negative] : Psychiatric [FreeTextEntry6] : mild  sob moslty anxiety related

## 2024-08-20 NOTE — PHYSICAL EXAM
[Alert] : alert [Well Nourished] : well nourished [Healthy Appearing] : healthy appearing [Well Developed] : well developed [Normal Voice/Communication] : normal voice/communication [Normal Appearance] : the appearance of the neck was normal [No Neck Mass] : no neck mass was observed [Thyroid Not Enlarged] : the thyroid was not enlarged [No Thyroid Nodules] : there were no palpable thyroid nodules [No Respiratory Distress] : no respiratory distress [No Acc Muscle Use] : no accessory muscle use [Respiration, Rhythm And Depth] : normal respiratory rhythm and effort [Auscultation Breath Sounds / Voice Sounds] : lungs were clear to auscultation bilaterally [Normal S1, S2] : normal S1 and S2 [Heart Rate And Rhythm] : heart rate was normal and rhythm regular [Edema] : edema was not present [Abdomen Tenderness] : non-tender [Abdomen Soft] : soft [Normal Gait] : normal gait [No Clubbing, Cyanosis] : no clubbing or cyanosis of the fingernails [Involuntary Movements] : no involuntary movements were seen [Motor Tone] : muscle strength and tone were normal [] : no rash [Normal Turgor] : normal skin turgor [Skin Lesions] : no skin lesions [No Focal Deficits] : no focal deficits [Motor Exam] : the motor exam was normal [Normal] : normal affect and normal mood [Oriented To Time, Place, And Person] : oriented to person, place, and time [Normal Affect] : the affect was normal [Normal Mood] : the mood was normal

## 2024-08-20 NOTE — HISTORY OF PRESENT ILLNESS
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 labs 21 mammo -  pap  dexa -  colonoscopy - never did cologuard she thinks  broke wrist -  fell  anxiety  takes oocas lorazapem  once a fib - no AC  right hip has been to three orthos had xrays, mri had two cortisone injections - no help had PRP in hip tear in gluteus minimimus - lifting grandchildren  has 4 children 7 grandchildren  23 pt has h/o premature heart disease follows with [facility] needs mammo and dexa had her first child at 18 everyone local  mother  at 90 - got dementia at 85 father  at 74 MI 3 brothers - younger -  MI at 46 - ok now prostate ca - age 49 - ok now third brother - had tear in aorta -  repair  walks 1-1 /2 miles a day does yoga  mother 90 and grandma 80 - both had dementia  23 pt oct 10 was walking in [facility] - chest pain taken to medical     center Had an MI ttwo stents placed at medical center  had flu vaccine needs covid booster had labs   24 last seen  9 months ago bleeding ulcer 2024 got 4 transfusions endoscopy clipped ulcer love 3 finished cardiac rehab 2024 went into a fib again felt it Dr Vann cards [facility] electrophysiologist - Dr Mathur - [facility]  cardioversion needed ablation after 5 episodes at    a fib  had ablation on colchicine to avoid inflamation afterwards - stopping this friday  today   feels well no chest pains no palpitations just went to  cardiolgist in [place] will wear a monitor  has mammo appt has gyn  had echo in  EF -   went from 30 -40             percent  ?prevnar needs covid booster [[id number]] : 0

## 2024-08-20 NOTE — ASSESSMENT
[FreeTextEntry1] : pt has achy thighs - asks re changing statin can consider change when wants  no recent afib  needs DEXA  needs colonoscopy  check labs today  7/11/23 pt is doing well asked re calcium score - to address - with dr conte checking annual labs ordered mammo and dexa doing gym   8/20/24 pt s/p a fib and ablation - now in [place] no sob  had bleeding ulcer -     on pantaprozole  had labs april, july 24, last hb 13.6  all ok reviewed on her phone

## 2024-11-01 ENCOUNTER — RX RENEWAL (OUTPATIENT)
Age: 74
End: 2024-11-01

## 2024-11-05 ENCOUNTER — NON-APPOINTMENT (OUTPATIENT)
Age: 74
End: 2024-11-05

## 2024-11-07 ENCOUNTER — RX RENEWAL (OUTPATIENT)
Age: 74
End: 2024-11-07

## 2025-01-27 ENCOUNTER — NON-APPOINTMENT (OUTPATIENT)
Age: 75
End: 2025-01-27

## 2025-01-28 ENCOUNTER — APPOINTMENT (OUTPATIENT)
Dept: GERIATRICS | Facility: CLINIC | Age: 75
End: 2025-01-28
Payer: MEDICARE

## 2025-01-28 VITALS
HEIGHT: 60.75 IN | BODY MASS INDEX: 28.69 KG/M2 | SYSTOLIC BLOOD PRESSURE: 121 MMHG | WEIGHT: 150 LBS | HEART RATE: 62 BPM | TEMPERATURE: 98.8 F | OXYGEN SATURATION: 97 % | DIASTOLIC BLOOD PRESSURE: 71 MMHG

## 2025-01-28 DIAGNOSIS — J20.9 ACUTE BRONCHITIS, UNSPECIFIED: ICD-10-CM

## 2025-01-28 DIAGNOSIS — R05.1 ACUTE COUGH: ICD-10-CM

## 2025-01-28 PROCEDURE — G2211 COMPLEX E/M VISIT ADD ON: CPT

## 2025-01-28 PROCEDURE — 99214 OFFICE O/P EST MOD 30 MIN: CPT

## 2025-01-28 RX ORDER — AZITHROMYCIN 250 MG/1
250 TABLET, FILM COATED ORAL
Qty: 1 | Refills: 0 | Status: ACTIVE | COMMUNITY
Start: 2025-01-28 | End: 1900-01-01

## 2025-02-03 LAB
25(OH)D3 SERPL-MCNC: 49.7 NG/ML
ALBUMIN SERPL ELPH-MCNC: 4.2 G/DL
ALP BLD-CCNC: 226 U/L
ALT SERPL-CCNC: 18 U/L
ANION GAP SERPL CALC-SCNC: 15 MMOL/L
AST SERPL-CCNC: 18 U/L
BILIRUB SERPL-MCNC: 0.5 MG/DL
BUN SERPL-MCNC: 12 MG/DL
CALCIUM SERPL-MCNC: 10.1 MG/DL
CHLORIDE SERPL-SCNC: 106 MMOL/L
CHOLEST SERPL-MCNC: 120 MG/DL
CO2 SERPL-SCNC: 20 MMOL/L
CREAT SERPL-MCNC: 0.78 MG/DL
EGFR: 80 ML/MIN/1.73M2
GLUCOSE SERPL-MCNC: 133 MG/DL
HCT VFR BLD CALC: 37 %
HDLC SERPL-MCNC: 55 MG/DL
HGB BLD-MCNC: 11.8 G/DL
LDLC SERPL CALC-MCNC: 45 MG/DL
MCHC RBC-ENTMCNC: 29.1 PG
MCHC RBC-ENTMCNC: 31.9 G/DL
MCV RBC AUTO: 91.4 FL
NONHDLC SERPL-MCNC: 64 MG/DL
PLATELET # BLD AUTO: 389 K/UL
POTASSIUM SERPL-SCNC: 4.3 MMOL/L
PROT SERPL-MCNC: 6.9 G/DL
RBC # BLD: 4.05 M/UL
RBC # FLD: 14.8 %
SODIUM SERPL-SCNC: 141 MMOL/L
TRIGL SERPL-MCNC: 103 MG/DL
WBC # FLD AUTO: 11.65 K/UL

## 2025-03-03 ENCOUNTER — RESULT REVIEW (OUTPATIENT)
Age: 75
End: 2025-03-03

## 2025-03-04 ENCOUNTER — RESULT REVIEW (OUTPATIENT)
Age: 75
End: 2025-03-04

## 2025-03-04 ENCOUNTER — TRANSCRIPTION ENCOUNTER (OUTPATIENT)
Age: 75
End: 2025-03-04

## 2025-03-04 ENCOUNTER — APPOINTMENT (OUTPATIENT)
Dept: GASTROENTEROLOGY | Facility: HOSPITAL | Age: 75
End: 2025-03-04

## 2025-03-05 ENCOUNTER — RESULT REVIEW (OUTPATIENT)
Age: 75
End: 2025-03-05

## 2025-03-10 ENCOUNTER — TRANSCRIPTION ENCOUNTER (OUTPATIENT)
Age: 75
End: 2025-03-10

## 2025-03-11 ENCOUNTER — NON-APPOINTMENT (OUTPATIENT)
Age: 75
End: 2025-03-11

## 2025-03-12 ENCOUNTER — NON-APPOINTMENT (OUTPATIENT)
Age: 75
End: 2025-03-12

## 2025-04-01 ENCOUNTER — APPOINTMENT (OUTPATIENT)
Dept: GERIATRICS | Facility: CLINIC | Age: 75
End: 2025-04-01

## 2025-04-01 VITALS
WEIGHT: 145 LBS | OXYGEN SATURATION: 96 % | SYSTOLIC BLOOD PRESSURE: 116 MMHG | HEART RATE: 69 BPM | TEMPERATURE: 97.7 F | BODY MASS INDEX: 27.63 KG/M2 | DIASTOLIC BLOOD PRESSURE: 72 MMHG

## 2025-04-01 DIAGNOSIS — K83.09 OTHER CHOLANGITIS: ICD-10-CM

## 2025-04-01 DIAGNOSIS — U07.1 COVID-19: ICD-10-CM

## 2025-04-01 DIAGNOSIS — R53.1 WEAKNESS: ICD-10-CM

## 2025-04-01 DIAGNOSIS — R05.1 ACUTE COUGH: ICD-10-CM

## 2025-04-01 DIAGNOSIS — R29.6 REPEATED FALLS: ICD-10-CM

## 2025-04-01 PROCEDURE — 99214 OFFICE O/P EST MOD 30 MIN: CPT

## 2025-04-01 RX ORDER — URSODIOL 300 MG/1
300 CAPSULE ORAL
Qty: 180 | Refills: 0 | Status: ACTIVE | COMMUNITY
Start: 2025-04-01

## 2025-04-01 RX ORDER — ELECTROLYTES/DEXTROSE
SOLUTION, ORAL ORAL DAILY
Refills: 0 | Status: ACTIVE | COMMUNITY
Start: 2025-04-01

## 2025-04-01 RX ORDER — CLOPIDOGREL BISULFATE 75 MG/1
75 TABLET, FILM COATED ORAL
Qty: 30 | Refills: 0 | Status: ACTIVE | COMMUNITY
Start: 2025-04-01

## 2025-05-02 ENCOUNTER — RESULT REVIEW (OUTPATIENT)
Age: 75
End: 2025-05-02

## 2025-06-03 ENCOUNTER — APPOINTMENT (OUTPATIENT)
Dept: GERIATRICS | Facility: CLINIC | Age: 75
End: 2025-06-03
Payer: MEDICARE

## 2025-06-03 VITALS
TEMPERATURE: 97.8 F | DIASTOLIC BLOOD PRESSURE: 65 MMHG | BODY MASS INDEX: 29.15 KG/M2 | SYSTOLIC BLOOD PRESSURE: 102 MMHG | WEIGHT: 153 LBS | OXYGEN SATURATION: 98 % | HEART RATE: 58 BPM

## 2025-06-03 DIAGNOSIS — I25.2 OLD MYOCARDIAL INFARCTION: ICD-10-CM

## 2025-06-03 DIAGNOSIS — E78.5 HYPERLIPIDEMIA, UNSPECIFIED: ICD-10-CM

## 2025-06-03 DIAGNOSIS — I48.0 PAROXYSMAL ATRIAL FIBRILLATION: ICD-10-CM

## 2025-06-03 DIAGNOSIS — F41.9 ANXIETY DISORDER, UNSPECIFIED: ICD-10-CM

## 2025-06-03 DIAGNOSIS — I25.10 ATHEROSCLEROTIC HEART DISEASE OF NATIVE CORONARY ARTERY W/OUT ANGINA PECTORIS: ICD-10-CM

## 2025-06-03 PROCEDURE — 99214 OFFICE O/P EST MOD 30 MIN: CPT

## 2025-06-03 PROCEDURE — G2211 COMPLEX E/M VISIT ADD ON: CPT

## 2025-06-04 ENCOUNTER — RESULT REVIEW (OUTPATIENT)
Age: 75
End: 2025-06-04

## 2025-06-09 ENCOUNTER — APPOINTMENT (OUTPATIENT)
Dept: GERIATRICS | Facility: CLINIC | Age: 75
End: 2025-06-09
Payer: MEDICARE

## 2025-06-09 PROBLEM — D64.9 ANEMIA, UNSPECIFIED TYPE: Status: ACTIVE | Noted: 2025-06-09

## 2025-06-09 PROCEDURE — 36415 COLL VENOUS BLD VENIPUNCTURE: CPT

## 2025-06-10 LAB
BASOPHILS # BLD AUTO: 0.05 K/UL
BASOPHILS NFR BLD AUTO: 1 %
EOSINOPHIL # BLD AUTO: 0.14 K/UL
EOSINOPHIL NFR BLD AUTO: 2.7 %
FERRITIN SERPL-MCNC: 9 NG/ML
FOLATE SERPL-MCNC: 9.5 NG/ML
HCT VFR BLD CALC: 31.6 %
HGB BLD-MCNC: 9.4 G/DL
IMM GRANULOCYTES NFR BLD AUTO: 0.2 %
IRON SATN MFR SERPL: 6 %
IRON SERPL-MCNC: 26 UG/DL
LYMPHOCYTES # BLD AUTO: 1.69 K/UL
LYMPHOCYTES NFR BLD AUTO: 32.8 %
MAN DIFF?: NORMAL
MCHC RBC-ENTMCNC: 24.9 PG
MCHC RBC-ENTMCNC: 29.7 G/DL
MCV RBC AUTO: 83.6 FL
MONOCYTES # BLD AUTO: 0.46 K/UL
MONOCYTES NFR BLD AUTO: 8.9 %
NEUTROPHILS # BLD AUTO: 2.8 K/UL
NEUTROPHILS NFR BLD AUTO: 54.4 %
PLATELET # BLD AUTO: 295 K/UL
RBC # BLD: 3.78 M/UL
RBC # FLD: 15.6 %
TIBC SERPL-MCNC: 456 UG/DL
UIBC SERPL-MCNC: 431 UG/DL
WBC # FLD AUTO: 5.15 K/UL

## 2025-06-17 ENCOUNTER — TRANSCRIPTION ENCOUNTER (OUTPATIENT)
Age: 75
End: 2025-06-17

## 2025-08-12 ENCOUNTER — TRANSCRIPTION ENCOUNTER (OUTPATIENT)
Age: 75
End: 2025-08-12